# Patient Record
Sex: FEMALE | Race: WHITE | NOT HISPANIC OR LATINO | URBAN - METROPOLITAN AREA
[De-identification: names, ages, dates, MRNs, and addresses within clinical notes are randomized per-mention and may not be internally consistent; named-entity substitution may affect disease eponyms.]

---

## 2018-08-01 ENCOUNTER — EMERGENCY (EMERGENCY)
Facility: HOSPITAL | Age: 33
LOS: 1 days | Discharge: HOME | End: 2018-08-01
Attending: EMERGENCY MEDICINE
Payer: COMMERCIAL

## 2018-08-01 VITALS
OXYGEN SATURATION: 100 % | TEMPERATURE: 98 F | SYSTOLIC BLOOD PRESSURE: 100 MMHG | RESPIRATION RATE: 17 BRPM | HEART RATE: 71 BPM | DIASTOLIC BLOOD PRESSURE: 60 MMHG

## 2018-08-01 VITALS
TEMPERATURE: 98 F | RESPIRATION RATE: 18 BRPM | OXYGEN SATURATION: 100 % | WEIGHT: 195.11 LBS | SYSTOLIC BLOOD PRESSURE: 138 MMHG | HEIGHT: 64 IN | HEART RATE: 81 BPM | DIASTOLIC BLOOD PRESSURE: 76 MMHG

## 2018-08-01 DIAGNOSIS — S96.919A STRAIN OF UNSPECIFIED MUSCLE AND TENDON AT ANKLE AND FOOT LEVEL, UNSPECIFIED FOOT, INITIAL ENCOUNTER: Chronic | ICD-10-CM

## 2018-08-01 LAB
ALBUMIN SERPL ELPH-MCNC: 4.6 G/DL — SIGNIFICANT CHANGE UP (ref 3.3–5)
ALP SERPL-CCNC: 50 U/L — SIGNIFICANT CHANGE UP (ref 40–120)
ALT FLD-CCNC: 11 U/L — SIGNIFICANT CHANGE UP (ref 10–45)
ANION GAP SERPL CALC-SCNC: 12 MMOL/L — SIGNIFICANT CHANGE UP (ref 5–17)
APPEARANCE UR: CLEAR — SIGNIFICANT CHANGE UP
AST SERPL-CCNC: 17 U/L — SIGNIFICANT CHANGE UP (ref 10–40)
BACTERIA # UR AUTO: ABNORMAL /HPF
BASOPHILS # BLD AUTO: 0 K/UL — SIGNIFICANT CHANGE UP (ref 0–0.2)
BASOPHILS NFR BLD AUTO: 0.3 % — SIGNIFICANT CHANGE UP (ref 0–2)
BILIRUB SERPL-MCNC: 0.4 MG/DL — SIGNIFICANT CHANGE UP (ref 0.2–1.2)
BILIRUB UR-MCNC: NEGATIVE — SIGNIFICANT CHANGE UP
BUN SERPL-MCNC: 12 MG/DL — SIGNIFICANT CHANGE UP (ref 7–23)
CALCIUM SERPL-MCNC: 9.5 MG/DL — SIGNIFICANT CHANGE UP (ref 8.4–10.5)
CHLORIDE SERPL-SCNC: 103 MMOL/L — SIGNIFICANT CHANGE UP (ref 96–108)
CO2 SERPL-SCNC: 26 MMOL/L — SIGNIFICANT CHANGE UP (ref 22–31)
COLOR SPEC: SIGNIFICANT CHANGE UP
CREAT SERPL-MCNC: 0.88 MG/DL — SIGNIFICANT CHANGE UP (ref 0.5–1.3)
DIFF PNL FLD: NEGATIVE — SIGNIFICANT CHANGE UP
EOSINOPHIL # BLD AUTO: 0.1 K/UL — SIGNIFICANT CHANGE UP (ref 0–0.5)
EOSINOPHIL NFR BLD AUTO: 2.1 % — SIGNIFICANT CHANGE UP (ref 0–6)
GAS PNL BLDV: SIGNIFICANT CHANGE UP
GLUCOSE SERPL-MCNC: 100 MG/DL — HIGH (ref 70–99)
GLUCOSE UR QL: NEGATIVE — SIGNIFICANT CHANGE UP
HCG UR QL: NEGATIVE — SIGNIFICANT CHANGE UP
HCT VFR BLD CALC: 42.5 % — SIGNIFICANT CHANGE UP (ref 34.5–45)
HGB BLD-MCNC: 14.1 G/DL — SIGNIFICANT CHANGE UP (ref 11.5–15.5)
KETONES UR-MCNC: NEGATIVE — SIGNIFICANT CHANGE UP
LEUKOCYTE ESTERASE UR-ACNC: NEGATIVE — SIGNIFICANT CHANGE UP
LYMPHOCYTES # BLD AUTO: 1.9 K/UL — SIGNIFICANT CHANGE UP (ref 1–3.3)
LYMPHOCYTES # BLD AUTO: 31.8 % — SIGNIFICANT CHANGE UP (ref 13–44)
MCHC RBC-ENTMCNC: 29.6 PG — SIGNIFICANT CHANGE UP (ref 27–34)
MCHC RBC-ENTMCNC: 33.2 GM/DL — SIGNIFICANT CHANGE UP (ref 32–36)
MCV RBC AUTO: 89.1 FL — SIGNIFICANT CHANGE UP (ref 80–100)
MONOCYTES # BLD AUTO: 0.5 K/UL — SIGNIFICANT CHANGE UP (ref 0–0.9)
MONOCYTES NFR BLD AUTO: 7.6 % — SIGNIFICANT CHANGE UP (ref 2–14)
NEUTROPHILS # BLD AUTO: 3.5 K/UL — SIGNIFICANT CHANGE UP (ref 1.8–7.4)
NEUTROPHILS NFR BLD AUTO: 58.2 % — SIGNIFICANT CHANGE UP (ref 43–77)
NITRITE UR-MCNC: NEGATIVE — SIGNIFICANT CHANGE UP
PH UR: 6.5 — SIGNIFICANT CHANGE UP (ref 5–8)
PLATELET # BLD AUTO: 158 K/UL — SIGNIFICANT CHANGE UP (ref 150–400)
POTASSIUM SERPL-MCNC: 3.9 MMOL/L — SIGNIFICANT CHANGE UP (ref 3.5–5.3)
POTASSIUM SERPL-SCNC: 3.9 MMOL/L — SIGNIFICANT CHANGE UP (ref 3.5–5.3)
PROT SERPL-MCNC: 7.6 G/DL — SIGNIFICANT CHANGE UP (ref 6–8.3)
PROT UR-MCNC: NEGATIVE — SIGNIFICANT CHANGE UP
RBC # BLD: 4.77 M/UL — SIGNIFICANT CHANGE UP (ref 3.8–5.2)
RBC # FLD: 12.1 % — SIGNIFICANT CHANGE UP (ref 10.3–14.5)
RBC CASTS # UR COMP ASSIST: SIGNIFICANT CHANGE UP /HPF (ref 0–2)
SODIUM SERPL-SCNC: 141 MMOL/L — SIGNIFICANT CHANGE UP (ref 135–145)
SP GR SPEC: 1.01 — LOW (ref 1.01–1.02)
UROBILINOGEN FLD QL: NEGATIVE — SIGNIFICANT CHANGE UP
WBC # BLD: 6 K/UL — SIGNIFICANT CHANGE UP (ref 3.8–10.5)
WBC # FLD AUTO: 6 K/UL — SIGNIFICANT CHANGE UP (ref 3.8–10.5)
WBC UR QL: SIGNIFICANT CHANGE UP /HPF (ref 0–5)

## 2018-08-01 PROCEDURE — 87086 URINE CULTURE/COLONY COUNT: CPT

## 2018-08-01 PROCEDURE — 85027 COMPLETE CBC AUTOMATED: CPT

## 2018-08-01 PROCEDURE — 82330 ASSAY OF CALCIUM: CPT

## 2018-08-01 PROCEDURE — 70450 CT HEAD/BRAIN W/O DYE: CPT | Mod: 26

## 2018-08-01 PROCEDURE — 82435 ASSAY OF BLOOD CHLORIDE: CPT

## 2018-08-01 PROCEDURE — 84295 ASSAY OF SERUM SODIUM: CPT

## 2018-08-01 PROCEDURE — 99284 EMERGENCY DEPT VISIT MOD MDM: CPT | Mod: 25

## 2018-08-01 PROCEDURE — 84132 ASSAY OF SERUM POTASSIUM: CPT

## 2018-08-01 PROCEDURE — 81001 URINALYSIS AUTO W/SCOPE: CPT

## 2018-08-01 PROCEDURE — 81025 URINE PREGNANCY TEST: CPT

## 2018-08-01 PROCEDURE — 85014 HEMATOCRIT: CPT

## 2018-08-01 PROCEDURE — 80053 COMPREHEN METABOLIC PANEL: CPT

## 2018-08-01 PROCEDURE — 83605 ASSAY OF LACTIC ACID: CPT

## 2018-08-01 PROCEDURE — 82803 BLOOD GASES ANY COMBINATION: CPT

## 2018-08-01 PROCEDURE — 70450 CT HEAD/BRAIN W/O DYE: CPT

## 2018-08-01 PROCEDURE — 93010 ELECTROCARDIOGRAM REPORT: CPT

## 2018-08-01 PROCEDURE — 93005 ELECTROCARDIOGRAM TRACING: CPT

## 2018-08-01 PROCEDURE — 82947 ASSAY GLUCOSE BLOOD QUANT: CPT

## 2018-08-01 RX ORDER — SODIUM CHLORIDE 9 MG/ML
1000 INJECTION INTRAMUSCULAR; INTRAVENOUS; SUBCUTANEOUS ONCE
Qty: 0 | Refills: 0 | Status: COMPLETED | OUTPATIENT
Start: 2018-08-01 | End: 2018-08-01

## 2018-08-01 RX ORDER — METOCLOPRAMIDE HCL 10 MG
10 TABLET ORAL ONCE
Qty: 0 | Refills: 0 | Status: DISCONTINUED | OUTPATIENT
Start: 2018-08-01 | End: 2018-08-01

## 2018-08-01 RX ORDER — ACETAMINOPHEN 500 MG
975 TABLET ORAL ONCE
Qty: 0 | Refills: 0 | Status: COMPLETED | OUTPATIENT
Start: 2018-08-01 | End: 2018-08-01

## 2018-08-01 RX ADMIN — SODIUM CHLORIDE 1000 MILLILITER(S): 9 INJECTION INTRAMUSCULAR; INTRAVENOUS; SUBCUTANEOUS at 14:08

## 2018-08-01 RX ADMIN — Medication 975 MILLIGRAM(S): at 14:07

## 2018-08-01 NOTE — ED PROVIDER NOTE - OBJECTIVE STATEMENT
32 y/o female with no PMHx presented to the ED c/o severe headache and LUE numbness since 10AM today. Patient stated initial symptom started when she driving which was left eye blurry vision which appeared to be "mirror like" and then pt started to experience left hand numbness which progressed up to left elbow. In addition, patient had left side tongue numbness and generalized severe frontal headache rated 8/10 described to be throbbing. Above neuro symptoms have resolved spontaneously within 5 minutes however headache persists now 3/10 in severity. Has not taken meds yet for headache. Patient stated she also feels dizzy from sitting to standing. Patient stated she only ate 10 potato chips this morning and has not drank much fluids. Wears contacts. Patient denied CP, SOB, abdominal pain, N/V/D, weakness, fall, OCP use, recent travel, recent illness, fever chills, h/o migraines, neuro disorders, myalgias, rash . Patient has not experienced an episode such as this in the past.

## 2018-08-01 NOTE — ED ADULT TRIAGE NOTE - NS ED TRIAGE AVPU SCALE
Alert-The patient is alert, awake and responds to voice. The patient is oriented to time, place, and person. The triage nurse is able to obtain subjective information. Home

## 2018-08-01 NOTE — ED PROVIDER NOTE - MEDICAL DECISION MAKING DETAILS
Attending Note (Steven): patient complaining of vision change and arm/face numbness that then resolved then was followed by sharp intense headache.  +family history of migraine. normal neuro exam.  well appearing.  likely complex migraine. no stroke risk factors. labs, ct, ekg.    headache improved.  ed workup negative.  dc with neurology follow up.

## 2018-08-01 NOTE — ED PROVIDER NOTE - CARE PLAN
Principal Discharge DX:	Headache  Assessment and plan of treatment:	Follow up with your Primary Care Physician within the next 2-3 days  Bring a copy of your test results with you to your appointment  Continue your current medication regimen  Return to the Emergency Room if you experience new or worsening symptoms   Patient to remain well hydrated and avoid skipping meals  Patient to take Tylenol 975mg every 6 hours as needed for headache  Return to ED for worsening headache or persistent headache

## 2018-08-01 NOTE — ED ADULT NURSE NOTE - NSIMPLEMENTINTERV_GEN_ALL_ED
Implemented All Universal Safety Interventions:  [place] to call system. Call bell, personal items and telephone within reach. Instruct patient to call for assistance. Room bathroom lighting operational. Non-slip footwear when patient is off stretcher. Physically safe environment: no spills, clutter or unnecessary equipment. Stretcher in lowest position, wheels locked, appropriate side rails in place.

## 2018-08-01 NOTE — ED PROVIDER NOTE - PLAN OF CARE
Follow up with your Primary Care Physician within the next 2-3 days  Bring a copy of your test results with you to your appointment  Continue your current medication regimen  Return to the Emergency Room if you experience new or worsening symptoms   Patient to remain well hydrated and avoid skipping meals  Patient to take Tylenol 975mg every 6 hours as needed for headache  Return to ED for worsening headache or persistent headache

## 2018-08-01 NOTE — ED ADULT NURSE NOTE - OBJECTIVE STATEMENT
32 yo female with no PMH presents to the ED from home c/o left arm soreness with frontal HA since 1100 today. patient states at 1000 this AM patient was driving and suddenly has left eye visual change. patient states when she would look downwards, "I saw a mirror reflection" that resolved after 5 minutes. patient states she then started to feel dizzy when going from standing and sitting positions. she states the dizziness has subsided but by 1030 she had left hand numbness that radiated up to left elbow and had left tongue numbness. patient states by 1100 all symptoms subsided except she began to have left arm soreness with frontal HA 3/10 pain. patient is AAOx3. PERRL. speech clear. gait steady. upper and lower extremities equal in strength and sensation bilaterally. no facial droop noted. patient lung sounds clear bilaterally. cap refill <3sec. radial and dorsal pulses present. skin intact. patient denies being on birth control, or smoking. no significant family history. patient denies chest pain, SOB, fevers, chills, dizziness, cough, abdominal pain, numbness or tingling, visual changes, neck stiffness. VSS. MD at the bedside. LMP 3 days ago, normal as per patient.

## 2018-08-01 NOTE — ED PROVIDER NOTE - PROGRESS NOTE DETAILS
YAEL Wells: Reassessed patient and patient stated headache has improved since receiving Tylenol. Patient safe to be discharged home as neuro exam intact, CTH negative, and symptoms have improved per ED attending Dr. Harris

## 2018-08-02 LAB
CULTURE RESULTS: SIGNIFICANT CHANGE UP
SPECIMEN SOURCE: SIGNIFICANT CHANGE UP

## 2018-12-04 ENCOUNTER — APPOINTMENT (OUTPATIENT)
Dept: ULTRASOUND IMAGING | Facility: CLINIC | Age: 33
End: 2018-12-04

## 2019-03-14 ENCOUNTER — APPOINTMENT (OUTPATIENT)
Dept: ANTEPARTUM | Facility: CLINIC | Age: 34
End: 2019-03-14

## 2019-03-15 ENCOUNTER — ASOB RESULT (OUTPATIENT)
Age: 34
End: 2019-03-15

## 2019-03-15 ENCOUNTER — APPOINTMENT (OUTPATIENT)
Dept: ANTEPARTUM | Facility: CLINIC | Age: 34
End: 2019-03-15
Payer: COMMERCIAL

## 2019-03-15 PROCEDURE — 76801 OB US < 14 WKS SINGLE FETUS: CPT

## 2019-03-15 PROCEDURE — 36416 COLLJ CAPILLARY BLOOD SPEC: CPT

## 2019-03-15 PROCEDURE — 76813 OB US NUCHAL MEAS 1 GEST: CPT

## 2019-03-29 ENCOUNTER — TRANSCRIPTION ENCOUNTER (OUTPATIENT)
Age: 34
End: 2019-03-29

## 2019-05-06 ENCOUNTER — APPOINTMENT (OUTPATIENT)
Dept: ANTEPARTUM | Facility: CLINIC | Age: 34
End: 2019-05-06
Payer: COMMERCIAL

## 2019-05-06 ENCOUNTER — ASOB RESULT (OUTPATIENT)
Age: 34
End: 2019-05-06

## 2019-05-06 PROCEDURE — 76817 TRANSVAGINAL US OBSTETRIC: CPT

## 2019-05-06 PROCEDURE — 76811 OB US DETAILED SNGL FETUS: CPT

## 2019-07-09 ENCOUNTER — APPOINTMENT (OUTPATIENT)
Dept: MATERNAL FETAL MEDICINE | Facility: CLINIC | Age: 34
End: 2019-07-09
Payer: COMMERCIAL

## 2019-07-09 ENCOUNTER — ASOB RESULT (OUTPATIENT)
Age: 34
End: 2019-07-09

## 2019-07-09 DIAGNOSIS — O99.810 ABNORMAL GLUCOSE COMPLICATING PREGNANCY: ICD-10-CM

## 2019-07-09 PROCEDURE — G0109 DIAB MANAGE TRN IND/GROUP: CPT

## 2019-07-09 RX ORDER — LANCETS 33 GAUGE
EACH MISCELLANEOUS
Qty: 1 | Refills: 2 | Status: ACTIVE | COMMUNITY
Start: 2019-07-09 | End: 1900-01-01

## 2019-07-09 RX ORDER — BLOOD-GLUCOSE METER
W/DEVICE KIT MISCELLANEOUS
Qty: 1 | Refills: 0 | Status: ACTIVE | COMMUNITY
Start: 2019-07-09 | End: 1900-01-01

## 2019-07-09 RX ORDER — BLOOD-GLUCOSE METER
KIT MISCELLANEOUS
Qty: 1 | Refills: 5 | Status: ACTIVE | COMMUNITY
Start: 2019-07-09 | End: 1900-01-01

## 2019-07-09 RX ORDER — URINE ACETONE TEST STRIPS
STRIP MISCELLANEOUS
Qty: 1 | Refills: 1 | Status: ACTIVE | COMMUNITY
Start: 2019-07-09 | End: 1900-01-01

## 2019-07-10 ENCOUNTER — APPOINTMENT (OUTPATIENT)
Dept: MATERNAL FETAL MEDICINE | Facility: CLINIC | Age: 34
End: 2019-07-10
Payer: COMMERCIAL

## 2019-07-10 PROCEDURE — G0109 DIAB MANAGE TRN IND/GROUP: CPT

## 2019-07-16 ENCOUNTER — ASOB RESULT (OUTPATIENT)
Age: 34
End: 2019-07-16

## 2019-07-16 ENCOUNTER — APPOINTMENT (OUTPATIENT)
Dept: MATERNAL FETAL MEDICINE | Facility: CLINIC | Age: 34
End: 2019-07-16
Payer: COMMERCIAL

## 2019-07-16 VITALS — WEIGHT: 224 LBS

## 2019-07-16 DIAGNOSIS — O24.414 GESTATIONAL DIABETES MELLITUS IN PREGNANCY, INSULIN CONTROLLED: ICD-10-CM

## 2019-07-16 PROCEDURE — G0108 DIAB MANAGE TRN  PER INDIV: CPT

## 2019-07-17 RX ORDER — PEN NEEDLE, DIABETIC 29 G X1/2"
31G X 5 MM NEEDLE, DISPOSABLE MISCELLANEOUS
Qty: 1 | Refills: 1 | Status: ACTIVE | COMMUNITY
Start: 2019-07-16 | End: 1900-01-01

## 2019-07-17 RX ORDER — ISOPROPYL ALCOHOL 0.7 ML/ML
SWAB TOPICAL
Qty: 2 | Refills: 2 | Status: ACTIVE | COMMUNITY
Start: 2019-07-17 | End: 1900-01-01

## 2019-07-24 ENCOUNTER — APPOINTMENT (OUTPATIENT)
Dept: MATERNAL FETAL MEDICINE | Facility: CLINIC | Age: 34
End: 2019-07-24
Payer: COMMERCIAL

## 2019-07-24 ENCOUNTER — ASOB RESULT (OUTPATIENT)
Age: 34
End: 2019-07-24

## 2019-07-24 PROCEDURE — G0108 DIAB MANAGE TRN  PER INDIV: CPT

## 2019-07-25 ENCOUNTER — OTHER (OUTPATIENT)
Age: 34
End: 2019-07-25

## 2019-07-25 RX ORDER — INSULIN HUMAN 100 [IU]/ML
100 INJECTION, SUSPENSION SUBCUTANEOUS
Qty: 1 | Refills: 1 | Status: COMPLETED | COMMUNITY
Start: 2019-07-16 | End: 2020-07-25

## 2019-08-01 ENCOUNTER — ASOB RESULT (OUTPATIENT)
Age: 34
End: 2019-08-01

## 2019-08-01 ENCOUNTER — APPOINTMENT (OUTPATIENT)
Dept: ANTEPARTUM | Facility: CLINIC | Age: 34
End: 2019-08-01
Payer: COMMERCIAL

## 2019-08-01 PROCEDURE — 76819 FETAL BIOPHYS PROFIL W/O NST: CPT

## 2019-08-01 PROCEDURE — 76811 OB US DETAILED SNGL FETUS: CPT

## 2019-08-21 ENCOUNTER — MESSAGE (OUTPATIENT)
Age: 34
End: 2019-08-21

## 2019-08-23 ENCOUNTER — MESSAGE (OUTPATIENT)
Age: 34
End: 2019-08-23

## 2019-08-26 ENCOUNTER — APPOINTMENT (OUTPATIENT)
Dept: ANTEPARTUM | Facility: CLINIC | Age: 34
End: 2019-08-26
Payer: COMMERCIAL

## 2019-08-26 ENCOUNTER — ASOB RESULT (OUTPATIENT)
Age: 34
End: 2019-08-26

## 2019-08-26 ENCOUNTER — OUTPATIENT (OUTPATIENT)
Dept: OUTPATIENT SERVICES | Facility: HOSPITAL | Age: 34
LOS: 1 days | End: 2019-08-26

## 2019-08-26 DIAGNOSIS — S96.919A STRAIN OF UNSPECIFIED MUSCLE AND TENDON AT ANKLE AND FOOT LEVEL, UNSPECIFIED FOOT, INITIAL ENCOUNTER: Chronic | ICD-10-CM

## 2019-08-26 PROCEDURE — 76818 FETAL BIOPHYS PROFILE W/NST: CPT | Mod: 26

## 2019-08-26 PROCEDURE — 76816 OB US FOLLOW-UP PER FETUS: CPT

## 2019-08-28 DIAGNOSIS — O34.211 MATERNAL CARE FOR LOW TRANSVERSE SCAR FROM PREVIOUS CESAREAN DELIVERY: ICD-10-CM

## 2019-08-28 DIAGNOSIS — Z3A.35 35 WEEKS GESTATION OF PREGNANCY: ICD-10-CM

## 2019-08-28 DIAGNOSIS — O24.414 GESTATIONAL DIABETES MELLITUS IN PREGNANCY, INSULIN CONTROLLED: ICD-10-CM

## 2019-08-28 DIAGNOSIS — O36.63X0 MATERNAL CARE FOR EXCESSIVE FETAL GROWTH, THIRD TRIMESTER, NOT APPLICABLE OR UNSPECIFIED: ICD-10-CM

## 2019-08-28 DIAGNOSIS — O99.213 OBESITY COMPLICATING PREGNANCY, THIRD TRIMESTER: ICD-10-CM

## 2019-09-03 ENCOUNTER — APPOINTMENT (OUTPATIENT)
Dept: ANTEPARTUM | Facility: CLINIC | Age: 34
End: 2019-09-03

## 2019-09-03 ENCOUNTER — ASOB RESULT (OUTPATIENT)
Age: 34
End: 2019-09-03

## 2019-09-03 ENCOUNTER — APPOINTMENT (OUTPATIENT)
Dept: ANTEPARTUM | Facility: CLINIC | Age: 34
End: 2019-09-03
Payer: COMMERCIAL

## 2019-09-03 ENCOUNTER — OUTPATIENT (OUTPATIENT)
Dept: OUTPATIENT SERVICES | Facility: HOSPITAL | Age: 34
LOS: 1 days | End: 2019-09-03

## 2019-09-03 VITALS
RESPIRATION RATE: 16 BRPM | WEIGHT: 227.96 LBS | OXYGEN SATURATION: 99 % | SYSTOLIC BLOOD PRESSURE: 120 MMHG | TEMPERATURE: 98 F | HEIGHT: 65 IN | HEART RATE: 95 BPM | DIASTOLIC BLOOD PRESSURE: 60 MMHG

## 2019-09-03 DIAGNOSIS — Z98.891 HISTORY OF UTERINE SCAR FROM PREVIOUS SURGERY: Chronic | ICD-10-CM

## 2019-09-03 DIAGNOSIS — O34.219 MATERNAL CARE FOR UNSPECIFIED TYPE SCAR FROM PREVIOUS CESAREAN DELIVERY: ICD-10-CM

## 2019-09-03 DIAGNOSIS — S96.919A STRAIN OF UNSPECIFIED MUSCLE AND TENDON AT ANKLE AND FOOT LEVEL, UNSPECIFIED FOOT, INITIAL ENCOUNTER: Chronic | ICD-10-CM

## 2019-09-03 DIAGNOSIS — O24.419 GESTATIONAL DIABETES MELLITUS IN PREGNANCY, UNSPECIFIED CONTROL: ICD-10-CM

## 2019-09-03 LAB
ANION GAP SERPL CALC-SCNC: 13 MMO/L — SIGNIFICANT CHANGE UP (ref 7–14)
APPEARANCE UR: CLEAR — SIGNIFICANT CHANGE UP
BILIRUB UR-MCNC: NEGATIVE — SIGNIFICANT CHANGE UP
BLOOD UR QL VISUAL: NEGATIVE — SIGNIFICANT CHANGE UP
BUN SERPL-MCNC: 7 MG/DL — SIGNIFICANT CHANGE UP (ref 7–23)
CALCIUM SERPL-MCNC: 9.6 MG/DL — SIGNIFICANT CHANGE UP (ref 8.4–10.5)
CHLORIDE SERPL-SCNC: 102 MMOL/L — SIGNIFICANT CHANGE UP (ref 98–107)
CO2 SERPL-SCNC: 22 MMOL/L — SIGNIFICANT CHANGE UP (ref 22–31)
COLOR SPEC: YELLOW — SIGNIFICANT CHANGE UP
CREAT SERPL-MCNC: 0.61 MG/DL — SIGNIFICANT CHANGE UP (ref 0.5–1.3)
GLUCOSE SERPL-MCNC: 82 MG/DL — SIGNIFICANT CHANGE UP (ref 70–99)
GLUCOSE UR-MCNC: NEGATIVE — SIGNIFICANT CHANGE UP
HCT VFR BLD CALC: 37.9 % — SIGNIFICANT CHANGE UP (ref 34.5–45)
HGB BLD-MCNC: 12.5 G/DL — SIGNIFICANT CHANGE UP (ref 11.5–15.5)
KETONES UR-MCNC: NEGATIVE — SIGNIFICANT CHANGE UP
LEUKOCYTE ESTERASE UR-ACNC: NEGATIVE — SIGNIFICANT CHANGE UP
MCHC RBC-ENTMCNC: 29.1 PG — SIGNIFICANT CHANGE UP (ref 27–34)
MCHC RBC-ENTMCNC: 33 % — SIGNIFICANT CHANGE UP (ref 32–36)
MCV RBC AUTO: 88.1 FL — SIGNIFICANT CHANGE UP (ref 80–100)
NITRITE UR-MCNC: NEGATIVE — SIGNIFICANT CHANGE UP
NRBC # FLD: 0 K/UL — SIGNIFICANT CHANGE UP (ref 0–0)
PH UR: 6.5 — SIGNIFICANT CHANGE UP (ref 5–8)
PLATELET # BLD AUTO: 102 K/UL — LOW (ref 150–400)
PMV BLD: 12.6 FL — SIGNIFICANT CHANGE UP (ref 7–13)
POTASSIUM SERPL-MCNC: 3.4 MMOL/L — LOW (ref 3.5–5.3)
POTASSIUM SERPL-SCNC: 3.4 MMOL/L — LOW (ref 3.5–5.3)
PROT UR-MCNC: NEGATIVE — SIGNIFICANT CHANGE UP
RBC # BLD: 4.3 M/UL — SIGNIFICANT CHANGE UP (ref 3.8–5.2)
RBC # FLD: 13.6 % — SIGNIFICANT CHANGE UP (ref 10.3–14.5)
SODIUM SERPL-SCNC: 137 MMOL/L — SIGNIFICANT CHANGE UP (ref 135–145)
SP GR SPEC: 1.02 — SIGNIFICANT CHANGE UP (ref 1–1.04)
UROBILINOGEN FLD QL: NORMAL — SIGNIFICANT CHANGE UP
WBC # BLD: 11.26 K/UL — HIGH (ref 3.8–10.5)
WBC # FLD AUTO: 11.26 K/UL — HIGH (ref 3.8–10.5)

## 2019-09-03 PROCEDURE — 76818 FETAL BIOPHYS PROFILE W/NST: CPT | Mod: 26

## 2019-09-03 RX ORDER — SODIUM CHLORIDE 9 MG/ML
1000 INJECTION, SOLUTION INTRAVENOUS
Refills: 0 | Status: DISCONTINUED | OUTPATIENT
Start: 2019-09-19 | End: 2019-09-19

## 2019-09-03 NOTE — OB PST NOTE - NSANTHOSAYNRD_GEN_A_CORE
No. YOKO screening performed.  STOP BANG Legend: 0-2 = LOW Risk; 3-4 = INTERMEDIATE Risk; 5-8 = HIGH Risk

## 2019-09-03 NOTE — OB PST NOTE - PMH
Anemia    Gestational diabetes mellitus    Maternal care for scar from previous  delivery    Thrombocytopenia  consult with hematology Dr Griffin - 12/8/15 - to be faxed

## 2019-09-03 NOTE — OB PST NOTE - PROBLEM SELECTOR PLAN 1
Scheduled for Repeat  Section on 2019  Preop instructions given, pt verbalized understanding   Chlorhexidine wash with instructions provided- pt verbalized understanding using teach back  CBC, Type, UA, BMP and RPR pending   Pt advised to hold Prenatal MVI 1 week prior   Pt to follow instructions of her OB/GYN regarding insulin usage prior to

## 2019-09-03 NOTE — OB PST NOTE - NSHPREVIEWOFSYSTEMS_GEN_ALL_CORE
General: No fever, chills, sweating, anorexia, weight loss or weight gain. Polydipsia. No polyphagia, polyuria, malaise, or fatigue    Skin: No rashes, itching, or dryness. No change in size/color of moles. No tumors, brittle nails, pitted nails, or hair loss    Breast: No tenderness, lumps, or nipple discharge      Ophthalmologic: No diplopia, photophobia, lacrimation, blurred Vision , or eye discharge    ENMT Symptoms: No hearing difficulty, ear pain, tinnitus, or vertigo. No sinus symptoms, nasal congestion, nasal   discharge, or nasal obstruction    Respiratory and Thorax: No wheezing, dyspnea, cough, hemoptysis, or pleuritic chest pain     Cardiovascular: peripheral edema. No chest pain, palpitations, dyspnea on exertion, orthopnea, paroxysmal nocturnal dyspnea, or claudication    Gastrointestinal: No nausea, vomiting, diarrhea, constipation, change in bowel habits, flatulence, abdominal pain, or melena    Genitourinary/ Pelvis: No hematuria, renal colic, or flank pain.  No urine discoloration, incontinence, dysuria, or urinary hesitancy. Normal urinary frequency. No nocturia, abnormal vaginal bleeding, vaginal discharge, spotting, pelvic pain, or vaginal leakage    Musculoskeletal: No arthralgia, arthritis, joint swelling, muscle cramping, muscle weakness, neck pain, arm pain, or leg pain    Neurological: No transient paralysis, weakness, paresthesias, or seizures. No syncope, tremors, vertigo, loss of sensation, difficulty walking, loss of consciousness, hemiparesis, confusion, or facial palsy    Psychiatric: No suicidal ideation, depression, anxiety, insomnia, memory loss, paranoia, mood swings, agitation, hallucinations, or hyperactivity    Hematology: Low platelet count. No gum bleeding, nose bleeding, or skin lumps.     Lymphatic: No enlarged or tender lymph nodes. No extremity swelling    Endocrine: Gestational DM. Fasting glucose 79-100mg/dl. On insulin at bedtime.     Immunologic: No recurrent or persistent infections

## 2019-09-03 NOTE — OB PST NOTE - NSHPPHYSICALEXAM_GEN_ALL_CORE
Constitutional: Well Developed, Well Groomed, Well Nourished, No Distress    Eyes: PERRL, EOMI, conjunctiva clear    Ears: Normal    Mouth & Gums: Normal, moist    Pharynx: No tenderness, discharge, or peritonsillar abscess    Tonsils: No Redness, discharge, tenderness, or swelling    Neck: Supple, no JVD, normal thyroid glands, no carotid bruits, no cervical vertebral or paraspinal tenderness    Breast: Normal shape    Back: Normal shape, ROM intact, strength intact, no vertebral tenderness    Respiratory: Airway patent, breath sounds equal, good air movement, respiration non-labored, clear to auscultation bilateral, no chest wall tenderness, no intercostal retractions, no rales, no wheezes     Cardiovascular:  Regular rate and rhythm, no rubs or murmur, normal PMI    Gastrointestinal: Pregnant abdomen. Pt reports fetal movement during PST interview    Extremities: No clubbing, cyanosis, or pedal edema    Vascular:  Carotid Pulse normal, Radial Pulse normal    Neurological: alert & oriented x 3, sensation intact, deep reflexes intact, cranial nerve intact, normal strength    Skin: warm and dry, normal color    Lymph Nodes: normal posterior cervical lymph node, normal anterior cervical lymph node, normal supraclavicular lymph node    Musculoskeletal: ROM intact, no joint swelling, warmth, or calf tenderness. Normal strength    Psychiatric: normal affect, normal behavior

## 2019-09-03 NOTE — OB PST NOTE - HISTORY OF PRESENT ILLNESS
35 y/o female  presents to PST for preoperative evaluation at 36 weeks pregnant. Pt scheduled for Repeat  Section on 2019. h/o primary  section in  at 39 weeks due to unstable lie (breech presentation).

## 2019-09-04 LAB
BLD GP AB SCN SERPL QL: NEGATIVE — SIGNIFICANT CHANGE UP
RH IG SCN BLD-IMP: POSITIVE — SIGNIFICANT CHANGE UP
T PALLIDUM AB TITR SER: NEGATIVE — SIGNIFICANT CHANGE UP

## 2019-09-05 DIAGNOSIS — O34.211 MATERNAL CARE FOR LOW TRANSVERSE SCAR FROM PREVIOUS CESAREAN DELIVERY: ICD-10-CM

## 2019-09-05 DIAGNOSIS — O24.414 GESTATIONAL DIABETES MELLITUS IN PREGNANCY, INSULIN CONTROLLED: ICD-10-CM

## 2019-09-05 DIAGNOSIS — O36.63X0 MATERNAL CARE FOR EXCESSIVE FETAL GROWTH, THIRD TRIMESTER, NOT APPLICABLE OR UNSPECIFIED: ICD-10-CM

## 2019-09-05 DIAGNOSIS — Z3A.36 36 WEEKS GESTATION OF PREGNANCY: ICD-10-CM

## 2019-09-05 DIAGNOSIS — O99.213 OBESITY COMPLICATING PREGNANCY, THIRD TRIMESTER: ICD-10-CM

## 2019-09-10 ENCOUNTER — ASOB RESULT (OUTPATIENT)
Age: 34
End: 2019-09-10

## 2019-09-10 ENCOUNTER — OUTPATIENT (OUTPATIENT)
Dept: OUTPATIENT SERVICES | Facility: HOSPITAL | Age: 34
LOS: 1 days | End: 2019-09-10

## 2019-09-10 ENCOUNTER — APPOINTMENT (OUTPATIENT)
Dept: ANTEPARTUM | Facility: CLINIC | Age: 34
End: 2019-09-10
Payer: COMMERCIAL

## 2019-09-10 ENCOUNTER — APPOINTMENT (OUTPATIENT)
Dept: ANTEPARTUM | Facility: HOSPITAL | Age: 34
End: 2019-09-10

## 2019-09-10 DIAGNOSIS — Z98.891 HISTORY OF UTERINE SCAR FROM PREVIOUS SURGERY: Chronic | ICD-10-CM

## 2019-09-10 DIAGNOSIS — S96.919A STRAIN OF UNSPECIFIED MUSCLE AND TENDON AT ANKLE AND FOOT LEVEL, UNSPECIFIED FOOT, INITIAL ENCOUNTER: Chronic | ICD-10-CM

## 2019-09-10 PROBLEM — O24.419 GESTATIONAL DIABETES MELLITUS IN PREGNANCY, UNSPECIFIED CONTROL: Chronic | Status: ACTIVE | Noted: 2019-09-03

## 2019-09-10 PROBLEM — O34.219 MATERNAL CARE FOR UNSPECIFIED TYPE SCAR FROM PREVIOUS CESAREAN DELIVERY: Chronic | Status: ACTIVE | Noted: 2019-09-03

## 2019-09-10 PROCEDURE — 76818 FETAL BIOPHYS PROFILE W/NST: CPT | Mod: 26

## 2019-09-16 ENCOUNTER — ASOB RESULT (OUTPATIENT)
Age: 34
End: 2019-09-16

## 2019-09-16 ENCOUNTER — APPOINTMENT (OUTPATIENT)
Dept: ANTEPARTUM | Facility: CLINIC | Age: 34
End: 2019-09-16
Payer: COMMERCIAL

## 2019-09-16 ENCOUNTER — APPOINTMENT (OUTPATIENT)
Dept: ANTEPARTUM | Facility: HOSPITAL | Age: 34
End: 2019-09-16
Payer: COMMERCIAL

## 2019-09-16 PROCEDURE — 76816 OB US FOLLOW-UP PER FETUS: CPT

## 2019-09-16 PROCEDURE — 76818 FETAL BIOPHYS PROFILE W/NST: CPT | Mod: 26

## 2019-09-18 ENCOUNTER — TRANSCRIPTION ENCOUNTER (OUTPATIENT)
Age: 34
End: 2019-09-18

## 2019-09-19 ENCOUNTER — INPATIENT (INPATIENT)
Facility: HOSPITAL | Age: 34
LOS: 2 days | Discharge: HOME | End: 2019-09-22
Attending: OBSTETRICS & GYNECOLOGY | Admitting: OBSTETRICS & GYNECOLOGY

## 2019-09-19 ENCOUNTER — TRANSCRIPTION ENCOUNTER (OUTPATIENT)
Age: 34
End: 2019-09-19

## 2019-09-19 VITALS — TEMPERATURE: 99 F | RESPIRATION RATE: 18 BRPM

## 2019-09-19 DIAGNOSIS — O34.219 MATERNAL CARE FOR UNSPECIFIED TYPE SCAR FROM PREVIOUS CESAREAN DELIVERY: ICD-10-CM

## 2019-09-19 DIAGNOSIS — S96.919A STRAIN OF UNSPECIFIED MUSCLE AND TENDON AT ANKLE AND FOOT LEVEL, UNSPECIFIED FOOT, INITIAL ENCOUNTER: Chronic | ICD-10-CM

## 2019-09-19 DIAGNOSIS — Z98.891 HISTORY OF UTERINE SCAR FROM PREVIOUS SURGERY: Chronic | ICD-10-CM

## 2019-09-19 LAB
APTT BLD: 29.6 SEC — SIGNIFICANT CHANGE UP (ref 27.5–36.3)
BLD GP AB SCN SERPL QL: NEGATIVE — SIGNIFICANT CHANGE UP
GLUCOSE BLDC GLUCOMTR-MCNC: 83 MG/DL — SIGNIFICANT CHANGE UP (ref 70–99)
GLUCOSE BLDC GLUCOMTR-MCNC: 92 MG/DL — SIGNIFICANT CHANGE UP (ref 70–99)
HCT VFR BLD CALC: 39.5 % — SIGNIFICANT CHANGE UP (ref 34.5–45)
HGB BLD-MCNC: 13 G/DL — SIGNIFICANT CHANGE UP (ref 11.5–15.5)
INR BLD: 0.92 — SIGNIFICANT CHANGE UP (ref 0.88–1.17)
MCHC RBC-ENTMCNC: 28.6 PG — SIGNIFICANT CHANGE UP (ref 27–34)
MCHC RBC-ENTMCNC: 32.9 % — SIGNIFICANT CHANGE UP (ref 32–36)
MCV RBC AUTO: 86.8 FL — SIGNIFICANT CHANGE UP (ref 80–100)
NRBC # FLD: 0 K/UL — SIGNIFICANT CHANGE UP (ref 0–0)
PLATELET # BLD AUTO: 105 K/UL — LOW (ref 150–400)
PMV BLD: 12.9 FL — SIGNIFICANT CHANGE UP (ref 7–13)
PROTHROM AB SERPL-ACNC: 10.2 SEC — SIGNIFICANT CHANGE UP (ref 9.8–13.1)
RBC # BLD: 4.55 M/UL — SIGNIFICANT CHANGE UP (ref 3.8–5.2)
RBC # FLD: 13.3 % — SIGNIFICANT CHANGE UP (ref 10.3–14.5)
RH IG SCN BLD-IMP: POSITIVE — SIGNIFICANT CHANGE UP
WBC # BLD: 13.09 K/UL — HIGH (ref 3.8–10.5)
WBC # FLD AUTO: 13.09 K/UL — HIGH (ref 3.8–10.5)

## 2019-09-19 RX ORDER — LANOLIN
1 OINTMENT (GRAM) TOPICAL EVERY 6 HOURS
Refills: 0 | Status: DISCONTINUED | OUTPATIENT
Start: 2019-09-19 | End: 2019-09-22

## 2019-09-19 RX ORDER — SODIUM CHLORIDE 9 MG/ML
1000 INJECTION, SOLUTION INTRAVENOUS
Refills: 0 | Status: DISCONTINUED | OUTPATIENT
Start: 2019-09-19 | End: 2019-09-19

## 2019-09-19 RX ORDER — OXYCODONE HYDROCHLORIDE 5 MG/1
5 TABLET ORAL
Refills: 0 | Status: DISCONTINUED | OUTPATIENT
Start: 2019-09-19 | End: 2019-09-22

## 2019-09-19 RX ORDER — ACETAMINOPHEN 500 MG
975 TABLET ORAL
Refills: 0 | Status: DISCONTINUED | OUTPATIENT
Start: 2019-09-19 | End: 2019-09-22

## 2019-09-19 RX ORDER — OXYTOCIN 10 UNIT/ML
333.33 VIAL (ML) INJECTION
Qty: 20 | Refills: 0 | Status: DISCONTINUED | OUTPATIENT
Start: 2019-09-19 | End: 2019-09-19

## 2019-09-19 RX ORDER — SODIUM CHLORIDE 9 MG/ML
1000 INJECTION, SOLUTION INTRAVENOUS ONCE
Refills: 0 | Status: DISCONTINUED | OUTPATIENT
Start: 2019-09-19 | End: 2019-09-19

## 2019-09-19 RX ORDER — HUMAN INSULIN 100 [IU]/ML
0 INJECTION, SUSPENSION SUBCUTANEOUS
Qty: 0 | Refills: 0 | DISCHARGE

## 2019-09-19 RX ORDER — OXYTOCIN 10 UNIT/ML
333.33 VIAL (ML) INJECTION
Qty: 20 | Refills: 0 | Status: DISCONTINUED | OUTPATIENT
Start: 2019-09-19 | End: 2019-09-21

## 2019-09-19 RX ORDER — KETOROLAC TROMETHAMINE 30 MG/ML
30 SYRINGE (ML) INJECTION EVERY 6 HOURS
Refills: 0 | Status: DISCONTINUED | OUTPATIENT
Start: 2019-09-19 | End: 2019-09-20

## 2019-09-19 RX ORDER — DIPHENHYDRAMINE HCL 50 MG
25 CAPSULE ORAL EVERY 6 HOURS
Refills: 0 | Status: DISCONTINUED | OUTPATIENT
Start: 2019-09-19 | End: 2019-09-22

## 2019-09-19 RX ORDER — OXYCODONE HYDROCHLORIDE 5 MG/1
5 TABLET ORAL ONCE
Refills: 0 | Status: DISCONTINUED | OUTPATIENT
Start: 2019-09-19 | End: 2019-09-22

## 2019-09-19 RX ORDER — CITRIC ACID/SODIUM CITRATE 300-500 MG
30 SOLUTION, ORAL ORAL ONCE
Refills: 0 | Status: DISCONTINUED | OUTPATIENT
Start: 2019-09-19 | End: 2019-09-19

## 2019-09-19 RX ORDER — HEPARIN SODIUM 5000 [USP'U]/ML
5000 INJECTION INTRAVENOUS; SUBCUTANEOUS EVERY 12 HOURS
Refills: 0 | Status: DISCONTINUED | OUTPATIENT
Start: 2019-09-19 | End: 2019-09-22

## 2019-09-19 RX ORDER — FAMOTIDINE 10 MG/ML
20 INJECTION INTRAVENOUS ONCE
Refills: 0 | Status: DISCONTINUED | OUTPATIENT
Start: 2019-09-19 | End: 2019-09-19

## 2019-09-19 RX ORDER — SIMETHICONE 80 MG/1
80 TABLET, CHEWABLE ORAL EVERY 4 HOURS
Refills: 0 | Status: DISCONTINUED | OUTPATIENT
Start: 2019-09-19 | End: 2019-09-22

## 2019-09-19 RX ORDER — MAGNESIUM HYDROXIDE 400 MG/1
30 TABLET, CHEWABLE ORAL
Refills: 0 | Status: DISCONTINUED | OUTPATIENT
Start: 2019-09-19 | End: 2019-09-22

## 2019-09-19 RX ORDER — GLYCERIN ADULT
1 SUPPOSITORY, RECTAL RECTAL AT BEDTIME
Refills: 0 | Status: DISCONTINUED | OUTPATIENT
Start: 2019-09-19 | End: 2019-09-22

## 2019-09-19 RX ORDER — FAMOTIDINE 10 MG/ML
20 INJECTION INTRAVENOUS ONCE
Refills: 0 | Status: COMPLETED | OUTPATIENT
Start: 2019-09-19 | End: 2019-09-19

## 2019-09-19 RX ORDER — TETANUS TOXOID, REDUCED DIPHTHERIA TOXOID AND ACELLULAR PERTUSSIS VACCINE, ADSORBED 5; 2.5; 8; 8; 2.5 [IU]/.5ML; [IU]/.5ML; UG/.5ML; UG/.5ML; UG/.5ML
0.5 SUSPENSION INTRAMUSCULAR ONCE
Refills: 0 | Status: DISCONTINUED | OUTPATIENT
Start: 2019-09-19 | End: 2019-09-22

## 2019-09-19 RX ORDER — METOCLOPRAMIDE HCL 10 MG
10 TABLET ORAL ONCE
Refills: 0 | Status: COMPLETED | OUTPATIENT
Start: 2019-09-19 | End: 2019-09-19

## 2019-09-19 RX ORDER — DOCUSATE SODIUM 100 MG
100 CAPSULE ORAL
Refills: 0 | Status: DISCONTINUED | OUTPATIENT
Start: 2019-09-19 | End: 2019-09-22

## 2019-09-19 RX ORDER — CITRIC ACID/SODIUM CITRATE 300-500 MG
30 SOLUTION, ORAL ORAL ONCE
Refills: 0 | Status: COMPLETED | OUTPATIENT
Start: 2019-09-19 | End: 2019-09-19

## 2019-09-19 RX ORDER — IBUPROFEN 200 MG
600 TABLET ORAL EVERY 6 HOURS
Refills: 0 | Status: COMPLETED | OUTPATIENT
Start: 2019-09-19 | End: 2020-08-17

## 2019-09-19 RX ORDER — METOCLOPRAMIDE HCL 10 MG
10 TABLET ORAL ONCE
Refills: 0 | Status: DISCONTINUED | OUTPATIENT
Start: 2019-09-19 | End: 2019-09-19

## 2019-09-19 RX ORDER — SODIUM CHLORIDE 9 MG/ML
1000 INJECTION, SOLUTION INTRAVENOUS
Refills: 0 | Status: DISCONTINUED | OUTPATIENT
Start: 2019-09-19 | End: 2019-09-20

## 2019-09-19 RX ORDER — SODIUM CHLORIDE 9 MG/ML
1000 INJECTION, SOLUTION INTRAVENOUS ONCE
Refills: 0 | Status: COMPLETED | OUTPATIENT
Start: 2019-09-19 | End: 2019-09-19

## 2019-09-19 RX ADMIN — HEPARIN SODIUM 5000 UNIT(S): 5000 INJECTION INTRAVENOUS; SUBCUTANEOUS at 20:26

## 2019-09-19 RX ADMIN — SODIUM CHLORIDE 75 MILLILITER(S): 9 INJECTION, SOLUTION INTRAVENOUS at 17:43

## 2019-09-19 RX ADMIN — Medication 1000 MILLIUNIT(S)/MIN: at 16:54

## 2019-09-19 RX ADMIN — Medication 30 MILLILITER(S): at 13:17

## 2019-09-19 RX ADMIN — Medication 10 MILLIGRAM(S): at 13:17

## 2019-09-19 RX ADMIN — SODIUM CHLORIDE 200 MILLILITER(S): 9 INJECTION, SOLUTION INTRAVENOUS at 14:10

## 2019-09-19 RX ADMIN — SODIUM CHLORIDE 2000 MILLILITER(S): 9 INJECTION, SOLUTION INTRAVENOUS at 13:16

## 2019-09-19 RX ADMIN — Medication 30 MILLIGRAM(S): at 20:27

## 2019-09-19 RX ADMIN — FAMOTIDINE 20 MILLIGRAM(S): 10 INJECTION INTRAVENOUS at 13:25

## 2019-09-19 RX ADMIN — Medication 30 MILLIGRAM(S): at 21:00

## 2019-09-19 NOTE — DISCHARGE NOTE OB - CARE PLAN
Principal Discharge DX:	 delivery delivered  Goal:	return to normal ADLs  Assessment and plan of treatment:	stable; vaginal rest and no heavy lifting

## 2019-09-19 NOTE — BRIEF OPERATIVE NOTE - NSICDXBRIEFPOSTOP_GEN_ALL_CORE_FT
POST-OP DIAGNOSIS:  Maternal care for scar from previous  delivery 19-Sep-2019 16:10:50  Noemi Carmen

## 2019-09-19 NOTE — DISCHARGE NOTE OB - PATIENT PORTAL LINK FT
You can access the FollowMyHealth Patient Portal offered by [facility] by registering at the following website: [email or web address]. By joining [employer]’s FollowMyHealth portal, you will also be able to view your health information using other applications (apps) compatible with our system.

## 2019-09-19 NOTE — BRIEF OPERATIVE NOTE - NSICDXBRIEFPREOP_GEN_ALL_CORE_FT
PRE-OP DIAGNOSIS:  Maternal care for scar from previous  delivery 19-Sep-2019 16:10:41  Noemi Carmen

## 2019-09-19 NOTE — OB NEONATOLOGY/PEDIATRICIAN DELIVERY SUMMARY - NSPEDSNEONOTESA_OBGYN_ALL_OB_FT
Baby is a 39.1 wk GA female born to a 33 y/o  mother via repeat C/S. Maternal history of GDMA2 on insulin, anemia, and thrombocytopenia. Prenatal history uncomplicated. Maternal blood type A+. PNL negative, non-reactive, and immune. GBS negative on . No ROM, clear fluids. Baby born vigorous and crying spontaneously but had a lot of secretions, with nasal flaring. Pulse ox showing sats in mid- 80s. CPAP  started at 7 min, gave for 5 min. With more suctioning, sats came up to 95-96%. Apgars 8/9 for color. Mom plans to breast and bottle feed, would like hepB.

## 2019-09-19 NOTE — DISCHARGE NOTE OB - MATERIALS PROVIDED
Vaccinations/Breastfeeding Guide and Packet/Birth Certificate Instructions/  Immunization Record/Breastfeeding Log/[facility] [place] Screening Program/Discharge Medication Information for Patients and Families Pocket Guide/Tdap Vaccination (VIS Pub Date: 2012)/Shaken Baby Prevention Handout/Breastfeeding Mother’s Support Group Information/Guide to Postpartum Care/[facility] Hearing Screen Program/Back To Sleep Handout

## 2019-09-19 NOTE — DISCHARGE NOTE OB - MEDICATION SUMMARY - MEDICATIONS TO STOP TAKING
I will STOP taking the medications listed below when I get home from the hospital:  None I will STOP taking the medications listed below when I get home from the hospital:    HumuLIN N 100 units/mL subcutaneous suspension  -- 54 units

## 2019-09-19 NOTE — OB RN PATIENT PROFILE - ALERT: PERTINENT HISTORY
Non Invasive Prenatal Screen (NIPS)/1st Trimester Sonogram/20 Week Level II Sonogram/BioPhysical Profile(s)/Ultra Screen at 12 Weeks/Fetal Non-Stress Test (NST)

## 2019-09-19 NOTE — OB PROVIDER H&P - HISTORY OF PRESENT ILLNESS
35 y/o female  at 39,1 here for scheduled rLTCS. Patient feels well today. +FM, -LOF, -VB, -ctx. AP course complicated by GDMA2 on Humulin N 56u at night and gestational thrombocytopenia, last Plts 102.   GBS-  EFW 8#9oz    OBHx:  pLTCS for breech 7#15oz, did not desire TOLAC  GynHx: hx of abnormal pap smear with colposcopy, latest have been normal. Denies STI, endo, cysts, ovaries.  PMSHx: as above  Meds: Humulin n 56u at night, PNV  All: PCN Hives  Social: Denies exposures. Denies anxiety or depression

## 2019-09-19 NOTE — OB RN DELIVERY SUMMARY - NS_SEPSISRSKCALC_OBGYN_ALL_OB_FT
EOS calculated successfully. EOS Risk Factor: 0.5/1000 live births ([facility] national incidence); GA=39w1d; Temp=98.8; ROM=0.033; GBS='Negative'; Antibiotics='No antibiotics or any antibiotics < 2 hrs prior to birth'

## 2019-09-19 NOTE — BRIEF OPERATIVE NOTE - OPERATION/FINDINGS
Liveborn female infant from JOSEPH position. Grossly normal uterus, tubes, ovaries. Hysterotomy closed in one layer. Etelvina applied over hysterotomy. Muscle, subq, skin closed. SUSAN dressing placed over Ster-strips. Good hemostasis noted. Count correctx3.

## 2019-09-19 NOTE — OB PROVIDER H&P - NSHPLABSRESULTS_GEN_ALL_CORE
13.0                  Neurophils% (auto):   x      (09-19 @ 12:25):    13.09)-----------(105          Lymphocytes% (auto):  x                                             39.5                   Eosinphils% (auto):   x        Manual%: Neutrophils x    ; Lymphocytes x    ; Eosinophils x    ; Bands%: x    ; Blasts x                ( 09-19 @ 12:25 )   PT: 10.2 SEC;   INR: 0.92   aPTT: 29.6 SEC

## 2019-09-19 NOTE — OB PROVIDER H&P - PROBLEM SELECTOR PLAN 1
-admit to L&D  -toco/efm  -admission labs  -anesthesia consult  -SUSAN dressing as pt is part of study    Plan per Dr. Radha Carmen PGY-2

## 2019-09-20 LAB
BASOPHILS # BLD AUTO: 0.03 K/UL — SIGNIFICANT CHANGE UP (ref 0–0.2)
BASOPHILS NFR BLD AUTO: 0.3 % — SIGNIFICANT CHANGE UP (ref 0–2)
EOSINOPHIL # BLD AUTO: 0.06 K/UL — SIGNIFICANT CHANGE UP (ref 0–0.5)
EOSINOPHIL NFR BLD AUTO: 0.6 % — SIGNIFICANT CHANGE UP (ref 0–6)
GLUCOSE BLDC GLUCOMTR-MCNC: 114 MG/DL — HIGH (ref 70–99)
GLUCOSE BLDC GLUCOMTR-MCNC: 127 MG/DL — HIGH (ref 70–99)
GLUCOSE BLDC GLUCOMTR-MCNC: 141 MG/DL — HIGH (ref 70–99)
GLUCOSE BLDC GLUCOMTR-MCNC: 142 MG/DL — HIGH (ref 70–99)
HCT VFR BLD CALC: 34.1 % — LOW (ref 34.5–45)
HGB BLD-MCNC: 11.3 G/DL — LOW (ref 11.5–15.5)
IMM GRANULOCYTES NFR BLD AUTO: 1.2 % — SIGNIFICANT CHANGE UP (ref 0–1.5)
LYMPHOCYTES # BLD AUTO: 1.32 K/UL — SIGNIFICANT CHANGE UP (ref 1–3.3)
LYMPHOCYTES # BLD AUTO: 13.4 % — SIGNIFICANT CHANGE UP (ref 13–44)
MCHC RBC-ENTMCNC: 29.1 PG — SIGNIFICANT CHANGE UP (ref 27–34)
MCHC RBC-ENTMCNC: 33.1 % — SIGNIFICANT CHANGE UP (ref 32–36)
MCV RBC AUTO: 87.9 FL — SIGNIFICANT CHANGE UP (ref 80–100)
MONOCYTES # BLD AUTO: 0.68 K/UL — SIGNIFICANT CHANGE UP (ref 0–0.9)
MONOCYTES NFR BLD AUTO: 6.9 % — SIGNIFICANT CHANGE UP (ref 2–14)
NEUTROPHILS # BLD AUTO: 7.67 K/UL — HIGH (ref 1.8–7.4)
NEUTROPHILS NFR BLD AUTO: 77.6 % — HIGH (ref 43–77)
NRBC # FLD: 0 K/UL — SIGNIFICANT CHANGE UP (ref 0–0)
PLATELET # BLD AUTO: 89 K/UL — LOW (ref 150–400)
PMV BLD: 13 FL — SIGNIFICANT CHANGE UP (ref 7–13)
RBC # BLD: 3.88 M/UL — SIGNIFICANT CHANGE UP (ref 3.8–5.2)
RBC # FLD: 13.4 % — SIGNIFICANT CHANGE UP (ref 10.3–14.5)
WBC # BLD: 9.88 K/UL — SIGNIFICANT CHANGE UP (ref 3.8–10.5)
WBC # FLD AUTO: 9.88 K/UL — SIGNIFICANT CHANGE UP (ref 3.8–10.5)

## 2019-09-20 RX ORDER — IBUPROFEN 200 MG
600 TABLET ORAL EVERY 6 HOURS
Refills: 0 | Status: DISCONTINUED | OUTPATIENT
Start: 2019-09-20 | End: 2019-09-22

## 2019-09-20 RX ADMIN — Medication 100 MILLIGRAM(S): at 13:11

## 2019-09-20 RX ADMIN — Medication 975 MILLIGRAM(S): at 10:59

## 2019-09-20 RX ADMIN — HEPARIN SODIUM 5000 UNIT(S): 5000 INJECTION INTRAVENOUS; SUBCUTANEOUS at 08:20

## 2019-09-20 RX ADMIN — Medication 975 MILLIGRAM(S): at 03:34

## 2019-09-20 RX ADMIN — Medication 30 MILLIGRAM(S): at 13:07

## 2019-09-20 RX ADMIN — Medication 30 MILLIGRAM(S): at 13:35

## 2019-09-20 RX ADMIN — Medication 600 MILLIGRAM(S): at 18:07

## 2019-09-20 RX ADMIN — HEPARIN SODIUM 5000 UNIT(S): 5000 INJECTION INTRAVENOUS; SUBCUTANEOUS at 21:01

## 2019-09-20 RX ADMIN — Medication 975 MILLIGRAM(S): at 21:00

## 2019-09-20 RX ADMIN — Medication 30 MILLIGRAM(S): at 04:12

## 2019-09-20 RX ADMIN — Medication 975 MILLIGRAM(S): at 11:55

## 2019-09-20 RX ADMIN — Medication 975 MILLIGRAM(S): at 02:45

## 2019-09-20 RX ADMIN — Medication 30 MILLIGRAM(S): at 03:49

## 2019-09-20 NOTE — LACTATION INITIAL EVALUATION - LACTATION INTERVENTIONS
initiate skin to skin/Instructed and assisted with positioning to facilitate deep latch.  Encouraged to feed on cue and follow the feeding log, reviewed.  Taught hand expression.  Discussed outpatient resources available, warm line , breastfeeding support group.  Encouraged to call for assistance, as needed. Primary RN made aware of consult./initiate hand expression routine

## 2019-09-20 NOTE — PROGRESS NOTE ADULT - SUBJECTIVE AND OBJECTIVE BOX
OB Progress Note:  Delivery, POD#1    S: 35yo POD#1 s/p LTCS . Her pain is well controlled. She is tolerating a regular diet and passing flatus. Denies N/V. Denies CP/SOB/lightheadedness/dizziness.   She is ambulating without difficulty.   Voiding spontaneously.   Requests to speak with lactation consultant.  O:   Vital Signs Last 24 Hrs  T(C): 36.6 (20 Sep 2019 14:19), Max: 36.8 (19 Sep 2019 18:30)  T(F): 97.9 (20 Sep 2019 14:19), Max: 98.2 (19 Sep 2019 18:30)  HR: 82 (20 Sep 2019 14:19) (67 - 83)  BP: 127/68 (20 Sep 2019 14:19) (110/49 - 128/58)  BP(mean): 72 (19 Sep 2019 18:00) (63 - 76)  RR: 16 (20 Sep 2019 14:19) (15 - 23)  SpO2: 100% (20 Sep 2019 14:19) (97% - 100%)    Labs:  Blood type: A Positive  Rubella IgG: RPR: Negative                          11.3<L>   9.88 >-----------< 89<L>    (  @ 06:33 )             34.1<L>                        13.0   13.09<H> >-----------< 105<L>    (  @ 12:25 )             39.5                  PE:  General: NAD  Abdomen: Mildly distended, appropriately tender, incision covered with SUSAN dressing.  Extremities: No erythema, no pitting edema        Vahid Hutchinson PGY1

## 2019-09-20 NOTE — PROGRESS NOTE ADULT - PROBLEM SELECTOR PLAN 1
- Continue regular diet.  - Increase ambulation.  - Continue motrin, tylenol, oxycodone PRN for pain control.   - F/u AM CBC

## 2019-09-20 NOTE — PROGRESS NOTE ADULT - ASSESSMENT
Day _2__ of Anesthesia Pain Management Service    SUBJECTIVE:  Pain Scale Score	At rest: __ 	With Activity: __ 	[x ] Refer to charted pain scores    THERAPY:    s/p _0.2______ mg PF morphine on _09/19/19_____      MEDICATIONS  (STANDING):  acetaminophen   Tablet .. 975 milliGRAM(s) Oral <User Schedule>  diphtheria/tetanus/pertussis (acellular) Vaccine (ADAcel) 0.5 milliLiter(s) IntraMuscular once  heparin  Injectable 5000 Unit(s) SubCutaneous every 12 hours  ibuprofen  Tablet. 600 milliGRAM(s) Oral every 6 hours  ketorolac   Injectable 30 milliGRAM(s) IV Push every 6 hours  lactated ringers. 1000 milliLiter(s) (75 mL/Hr) IV Continuous <Continuous>  oxytocin Infusion 333.333 milliUNIT(s)/Min (1000 mL/Hr) IV Continuous <Continuous>    MEDICATIONS  (PRN):  diphenhydrAMINE 25 milliGRAM(s) Oral every 6 hours PRN Itching  docusate sodium 100 milliGRAM(s) Oral two times a day PRN Stool softening  glycerin Suppository - Adult 1 Suppository(s) Rectal at bedtime PRN Constipation  lanolin Ointment 1 Application(s) Topical every 6 hours PRN Sore Nipples  magnesium hydroxide Suspension 30 milliLiter(s) Oral two times a day PRN Constipation  oxyCODONE    IR 5 milliGRAM(s) Oral every 3 hours PRN Moderate to Severe Pain (4-10)  oxyCODONE    IR 5 milliGRAM(s) Oral once PRN Moderate to Severe Pain (4-10)  simethicone 80 milliGRAM(s) Chew every 4 hours PRN Gas      OBJECTIVE:    Sedation Score:	[x ] Alert	[ ] Drowsy	[ ] Arousable	[ ] Asleep	[ ] Unresponsive    Side Effects:	[ x ] None	[ ] Nausea	[ ] Vomiting	[ ] Pruritus  		  [ ] Weakness		[ ] Numbness	[ ] Other:    Vital Signs Last 24 Hrs  T(C): 36.8 (20 Sep 2019 10:31), Max: 37.1 (19 Sep 2019 11:53)  T(F): 98.2 (20 Sep 2019 10:31), Max: 98.8 (19 Sep 2019 12:40)  HR: 82 (20 Sep 2019 10:31) (67 - 95)  BP: 127/82 (20 Sep 2019 10:31) (104/48 - 136/78)  BP(mean): 72 (19 Sep 2019 18:00) (60 - 76)  RR: 16 (20 Sep 2019 10:31) (15 - 23)  SpO2: 100% (20 Sep 2019 10:31) (97% - 100%)    ASSESSMENT/ PLAN  [x ] Patient transitioned to prn analgesics  [x ] Pain management per primary service, pain service to sign off   [x ]Documentation and Verification of current medications     Comments:
A/P: 33yo POD#1 s/p LTCS.  Patient is stable and doing well post-operatively.

## 2019-09-21 RX ORDER — ACETAMINOPHEN 500 MG
3 TABLET ORAL
Qty: 0 | Refills: 0 | DISCHARGE
Start: 2019-09-21

## 2019-09-21 RX ORDER — IBUPROFEN 200 MG
1 TABLET ORAL
Qty: 0 | Refills: 0 | DISCHARGE
Start: 2019-09-21

## 2019-09-21 RX ADMIN — Medication 975 MILLIGRAM(S): at 04:11

## 2019-09-21 RX ADMIN — Medication 600 MILLIGRAM(S): at 06:52

## 2019-09-21 RX ADMIN — Medication 100 MILLIGRAM(S): at 18:28

## 2019-09-21 RX ADMIN — Medication 975 MILLIGRAM(S): at 11:30

## 2019-09-21 RX ADMIN — Medication 600 MILLIGRAM(S): at 13:17

## 2019-09-21 RX ADMIN — Medication 975 MILLIGRAM(S): at 04:40

## 2019-09-21 RX ADMIN — Medication 975 MILLIGRAM(S): at 22:37

## 2019-09-21 RX ADMIN — Medication 600 MILLIGRAM(S): at 18:28

## 2019-09-21 RX ADMIN — Medication 600 MILLIGRAM(S): at 13:18

## 2019-09-21 RX ADMIN — HEPARIN SODIUM 5000 UNIT(S): 5000 INJECTION INTRAVENOUS; SUBCUTANEOUS at 10:57

## 2019-09-21 RX ADMIN — Medication 600 MILLIGRAM(S): at 00:36

## 2019-09-21 RX ADMIN — Medication 600 MILLIGRAM(S): at 07:30

## 2019-09-21 RX ADMIN — Medication 600 MILLIGRAM(S): at 01:36

## 2019-09-21 RX ADMIN — Medication 975 MILLIGRAM(S): at 10:58

## 2019-09-21 NOTE — PROGRESS NOTE ADULT - SUBJECTIVE AND OBJECTIVE BOX
Postpartum Note,  Section  She is a  34y woman who is now post-operative day: 2    Subjective:  The patient feels well.  She is ambulating.   She is tolerating regular diet.  She denies nausea and vomiting.  She is voiding.  Her pain is controlled.  She reports normal postpartum bleeding.  She is breastfeeding.  She is formula feeding.    Physical exam:    Vital Signs Last 24 Hrs  T(C): 36.9 (21 Sep 2019 05:51), Max: 36.9 (21 Sep 2019 05:51)  T(F): 98.5 (21 Sep 2019 05:51), Max: 98.5 (21 Sep 2019 05:51)  HR: 72 (21 Sep 2019 05:51) (72 - 82)  BP: 114/61 (21 Sep 2019 05:51) (114/61 - 127/82)  BP(mean): --  RR: 16 (21 Sep 2019 05:51) (16 - 17)  SpO2: 99% (21 Sep 2019 05:51) (99% - 100%)    Gen: NAD  Breast: Soft, nontender, not engorged.  Abdomen: Soft, nontender, no distension , firm uterine fundus at umbilicus.  Incision: Kinza dressing intact   Pelvic: Normal lochia noted  Ext: No calf tenderness    LABS:                        11.3   9.88  )-----------( 89       ( 20 Sep 2019 06:33 )             34.1       Rubella status:     Allergies    penicillin (Rash)    Intolerances      MEDICATIONS  (STANDING):  acetaminophen   Tablet .. 975 milliGRAM(s) Oral <User Schedule>  diphtheria/tetanus/pertussis (acellular) Vaccine (ADAcel) 0.5 milliLiter(s) IntraMuscular once  heparin  Injectable 5000 Unit(s) SubCutaneous every 12 hours  ibuprofen  Tablet. 600 milliGRAM(s) Oral every 6 hours    MEDICATIONS  (PRN):  diphenhydrAMINE 25 milliGRAM(s) Oral every 6 hours PRN Itching  docusate sodium 100 milliGRAM(s) Oral two times a day PRN Stool softening  glycerin Suppository - Adult 1 Suppository(s) Rectal at bedtime PRN Constipation  lanolin Ointment 1 Application(s) Topical every 6 hours PRN Sore Nipples  magnesium hydroxide Suspension 30 milliLiter(s) Oral two times a day PRN Constipation  oxyCODONE    IR 5 milliGRAM(s) Oral every 3 hours PRN Moderate to Severe Pain (4-10)  oxyCODONE    IR 5 milliGRAM(s) Oral once PRN Moderate to Severe Pain (4-10)  simethicone 80 milliGRAM(s) Chew every 4 hours PRN Gas        Assessment and Plan  POD #2 s/p  section  Doing well.  Encourage ambulation.    KRIS Singh

## 2019-09-22 VITALS
TEMPERATURE: 98 F | SYSTOLIC BLOOD PRESSURE: 124 MMHG | DIASTOLIC BLOOD PRESSURE: 65 MMHG | RESPIRATION RATE: 18 BRPM | HEART RATE: 66 BPM | OXYGEN SATURATION: 99 %

## 2019-09-22 RX ADMIN — Medication 600 MILLIGRAM(S): at 10:00

## 2019-09-22 RX ADMIN — Medication 600 MILLIGRAM(S): at 09:16

## 2019-09-22 RX ADMIN — HEPARIN SODIUM 5000 UNIT(S): 5000 INJECTION INTRAVENOUS; SUBCUTANEOUS at 00:35

## 2019-09-22 RX ADMIN — Medication 600 MILLIGRAM(S): at 00:35

## 2019-09-22 NOTE — PROGRESS NOTE ADULT - SUBJECTIVE AND OBJECTIVE BOX
Patient assessed at 0823.  Subjective  Pain: Patient denies any pain at the time of assessment. Pain being managed well by pain management protocol.  Complaints: Patient reports bilateral burning to both side of incision with pruritis of SUSAN dressing and requesting for SUSAN dressing to be removed. Patient denies any headache, blur vision, dizziness and/or weakness/fatigue. Patient reports history of gestational thrombocytopenia this pregnancy and last pregnancy but only seen a hematologist with last pregnancy. patient states gestational thrombocytopenia resolved after last pregnancy but returned in this pregnancy. Patient denies any bruising.   Milestones:  Alert and orientedx3. Out of bed ambulating. Positive flatus. Positive bowel movement. Voiding.  Tolerating a regular diet.  Infant feeding: breastfeeding  Feeding related issues and/or concerns: none    Objective  Vital Signs:  Vital Signs Last 24 Hrs  T(C): 36.8 (22 Sep 2019 05:53), Max: 36.9 (21 Sep 2019 13:56)  T(F): 98.3 (22 Sep 2019 05:53), Max: 98.5 (21 Sep 2019 13:56)  HR: 66 (22 Sep 2019 05:53) (66 - 76)  BP: 124/65 (22 Sep 2019 05:53) (113/64 - 129/70)  BP(mean): --  RR: 18 (22 Sep 2019 05:53) (18 - 18)  SpO2: 99% (22 Sep 2019 05:53) (98% - 100%)    Labs:    Blood Type: Apositive  Rubella: Immune  RPR: nonreactive    Medications  MEDICATIONS  (STANDING):  acetaminophen   Tablet .. 975 milliGRAM(s) Oral <User Schedule>  diphtheria/tetanus/pertussis (acellular) Vaccine (ADAcel) 0.5 milliLiter(s) IntraMuscular once  heparin  Injectable 5000 Unit(s) SubCutaneous every 12 hours  ibuprofen  Tablet. 600 milliGRAM(s) Oral every 6 hours    MEDICATIONS  (PRN):  diphenhydrAMINE 25 milliGRAM(s) Oral every 6 hours PRN Itching  docusate sodium 100 milliGRAM(s) Oral two times a day PRN Stool softening  glycerin Suppository - Adult 1 Suppository(s) Rectal at bedtime PRN Constipation  lanolin Ointment 1 Application(s) Topical every 6 hours PRN Sore Nipples  magnesium hydroxide Suspension 30 milliLiter(s) Oral two times a day PRN Constipation  oxyCODONE    IR 5 milliGRAM(s) Oral every 3 hours PRN Moderate to Severe Pain (4-10)  oxyCODONE    IR 5 milliGRAM(s) Oral once PRN Moderate to Severe Pain (4-10)  simethicone 80 milliGRAM(s) Chew every 4 hours PRN Gas    Assessment  33y/o     Day#3    repeat post-operative  section delivery                                        Condition: Stable  Past Medical/Surgical/OB/GYN History significant for: gestational diabetes insulin postpartum fingersticks 92/114/127/142/141, gestational thrombocytopenia last platelet count 89 (19 )  Current Issues:  Lung sounds clear bilaterally.  Breasts: soft, nontender  Nipples: left nipple sore  Abdomen: Soft, nondistended and nontender. Bowel sounds present. Fundus firm  Abdominal incision: SUSAN dressing in place with noticeable erythema to right and left of dressing. SUSAN dressing removed to reveal steri strips intact with slight moisture, no odor noted. Abdominal pad placed.    Vaginal: Lochia light rubra  Extremities: Moderate edema noted bilaterally and equal to lower extremities, nontender with no erythremic areas noted. Positive pedal pulses. No palpable veins noted  Other relevant physical exam findings: no bruising noted to body    Plan  Abdominal incision to be reassess in 1 hour due to slight moisture.  Continue routine post-operative and postpartum care  Increase ambulation, analgesia PRN and pain medication protocol of standing ibuprofen and acetaminophen and oxycodone prn  Encouraged to wear abdominal binder for support and to use incentive spirometer  Discussed abdominal incision care.   Discussed sign/symptoms to report due to gestational thrombocytopenia last platelets 89.  Glucose testing needed in 6weeks for history of gestational diabetes insulin controlled  Discussed breastfeeding.   Discharge to home today. Discussed discharge planning.

## 2019-09-25 DIAGNOSIS — O99.213 OBESITY COMPLICATING PREGNANCY, THIRD TRIMESTER: ICD-10-CM

## 2019-09-25 DIAGNOSIS — O36.63X0 MATERNAL CARE FOR EXCESSIVE FETAL GROWTH, THIRD TRIMESTER, NOT APPLICABLE OR UNSPECIFIED: ICD-10-CM

## 2019-09-25 DIAGNOSIS — O24.414 GESTATIONAL DIABETES MELLITUS IN PREGNANCY, INSULIN CONTROLLED: ICD-10-CM

## 2020-02-05 ENCOUNTER — RESULT REVIEW (OUTPATIENT)
Age: 35
End: 2020-02-05

## 2020-07-10 NOTE — OB PST NOTE - SUBSTANCE USE COMMENT, PROFILE
This has been a chronic condition.  The patient is currently taking Cytomel 5 mg daily.  Patient had lab work done approximately 10 days ago however the patient stated that she wishes to redo the lab work as she has not had a dose adjustment.   denies drug use

## 2022-03-21 NOTE — OB RN PATIENT PROFILE - EDUCATION ON THE POTENTIAL RISKS AND IMPACT OF EARLY USE OF PACIFIERS ON THE ESTABLISHMENT OF BREASTFEEDING
Additional History: Pt has not treated with prescriptions, has used OTC itch cream that does not help\\n
Statement Selected

## 2022-10-26 ENCOUNTER — RESULT REVIEW (OUTPATIENT)
Age: 37
End: 2022-10-26

## 2022-11-14 ENCOUNTER — RESULT REVIEW (OUTPATIENT)
Age: 37
End: 2022-11-14

## 2022-11-14 ENCOUNTER — APPOINTMENT (OUTPATIENT)
Dept: MAMMOGRAPHY | Facility: CLINIC | Age: 37
End: 2022-11-14

## 2022-11-14 ENCOUNTER — APPOINTMENT (OUTPATIENT)
Dept: ULTRASOUND IMAGING | Facility: CLINIC | Age: 37
End: 2022-11-14

## 2022-11-14 PROCEDURE — G0279: CPT

## 2022-11-14 PROCEDURE — 76641 ULTRASOUND BREAST COMPLETE: CPT | Mod: 50

## 2022-11-14 PROCEDURE — 77066 DX MAMMO INCL CAD BI: CPT

## 2024-02-09 ENCOUNTER — RESULT REVIEW (OUTPATIENT)
Age: 39
End: 2024-02-09

## 2024-04-10 NOTE — DISCHARGE NOTE OB - CARE PROVIDER_API CALL
Jennifer Garcia)  Obstetrics and Gynecology  [address] [place] [address]  Phone: [phone]  Fax: [phone]  Follow Up Time: Previously Declined (within the last year)

## 2024-08-09 ENCOUNTER — RESULT REVIEW (OUTPATIENT)
Age: 39
End: 2024-08-09

## 2024-08-09 ENCOUNTER — APPOINTMENT (OUTPATIENT)
Dept: MAMMOGRAPHY | Facility: CLINIC | Age: 39
End: 2024-08-09

## 2024-08-09 ENCOUNTER — APPOINTMENT (OUTPATIENT)
Dept: ULTRASOUND IMAGING | Facility: CLINIC | Age: 39
End: 2024-08-09

## 2024-08-09 PROCEDURE — 76830 TRANSVAGINAL US NON-OB: CPT

## 2024-08-09 PROCEDURE — 77067 SCR MAMMO BI INCL CAD: CPT

## 2024-08-09 PROCEDURE — 76641 ULTRASOUND BREAST COMPLETE: CPT | Mod: 50

## 2024-08-09 PROCEDURE — 77063 BREAST TOMOSYNTHESIS BI: CPT

## 2024-08-16 NOTE — OB PROVIDER H&P - NS_FINALEDD_OBGYN_ALL_OB_DT
Airway    Performed by: Brad Guerra CRNA  Authorized by: Oli Coelho MD    Final Airway Type:  Supraglottic airway  Final Supraglottic Airway:  Unique  SGA Size*:  4  Attempts*:  1   Patient Identified, Procedure confirmed, Emergency equipment available and Safety protocols followed  Location:  OR  Urgency:  Elective  Difficult Airway: No    Indications for Airway Management:  Anesthesia  Spontaneous Ventilation: absent    Sedation Level:  Anesthetized  MILS Maintained Throughout: No    Mask Difficulty Assessment:  0 - not attempted  Start Time: 8/16/2024 10:03 AM       25-Sep-2019

## 2025-02-13 NOTE — ED PROVIDER NOTE - RESPIRATORY, MLM
Breath sounds clear and equal bilaterally. Partially impaired: cannot see medication labels or newsprint, but can see obstacles in path, and the surrounding layout; can count fingers at arm's length

## 2025-08-11 ENCOUNTER — RESULT REVIEW (OUTPATIENT)
Age: 40
End: 2025-08-11